# Patient Record
Sex: MALE | Race: WHITE | Employment: OTHER | URBAN - NONMETROPOLITAN AREA
[De-identification: names, ages, dates, MRNs, and addresses within clinical notes are randomized per-mention and may not be internally consistent; named-entity substitution may affect disease eponyms.]

---

## 2017-01-10 ENCOUNTER — TELEPHONE (OUTPATIENT)
Dept: FAMILY MEDICINE CLINIC | Age: 72
End: 2017-01-10

## 2017-01-24 ENCOUNTER — OFFICE VISIT (OUTPATIENT)
Dept: FAMILY MEDICINE CLINIC | Age: 72
End: 2017-01-24
Payer: MEDICARE

## 2017-01-24 VITALS
WEIGHT: 255.1 LBS | SYSTOLIC BLOOD PRESSURE: 122 MMHG | OXYGEN SATURATION: 98 % | RESPIRATION RATE: 18 BRPM | HEIGHT: 72 IN | DIASTOLIC BLOOD PRESSURE: 80 MMHG | HEART RATE: 80 BPM | BODY MASS INDEX: 34.55 KG/M2

## 2017-01-24 DIAGNOSIS — R06.02 SOB (SHORTNESS OF BREATH) ON EXERTION: ICD-10-CM

## 2017-01-24 DIAGNOSIS — R94.31 ABNORMAL ELECTROCARDIOGRAM (ECG) (EKG): ICD-10-CM

## 2017-01-24 DIAGNOSIS — R00.2 HEART PALPITATIONS: Primary | ICD-10-CM

## 2017-01-24 PROCEDURE — G8484 FLU IMMUNIZE NO ADMIN: HCPCS | Performed by: NURSE PRACTITIONER

## 2017-01-24 PROCEDURE — G8419 CALC BMI OUT NRM PARAM NOF/U: HCPCS | Performed by: NURSE PRACTITIONER

## 2017-01-24 PROCEDURE — 3017F COLORECTAL CA SCREEN DOC REV: CPT | Performed by: NURSE PRACTITIONER

## 2017-01-24 PROCEDURE — 4040F PNEUMOC VAC/ADMIN/RCVD: CPT | Performed by: NURSE PRACTITIONER

## 2017-01-24 PROCEDURE — 99213 OFFICE O/P EST LOW 20 MIN: CPT | Performed by: NURSE PRACTITIONER

## 2017-01-24 PROCEDURE — 1123F ACP DISCUSS/DSCN MKR DOCD: CPT | Performed by: NURSE PRACTITIONER

## 2017-01-24 PROCEDURE — G8427 DOCREV CUR MEDS BY ELIG CLIN: HCPCS | Performed by: NURSE PRACTITIONER

## 2017-01-24 PROCEDURE — 1036F TOBACCO NON-USER: CPT | Performed by: NURSE PRACTITIONER

## 2017-01-24 RX ORDER — ASPIRIN 81 MG/1
81 TABLET ORAL DAILY
Qty: 30 TABLET | Refills: 3 | Status: SHIPPED | OUTPATIENT
Start: 2017-01-24 | End: 2017-06-01 | Stop reason: SDUPTHER

## 2017-01-24 ASSESSMENT — ENCOUNTER SYMPTOMS
EYE ITCHING: 0
TROUBLE SWALLOWING: 0
COUGH: 0
EYE PAIN: 0
SHORTNESS OF BREATH: 1
WHEEZING: 0
SINUS PRESSURE: 0
BLOOD IN STOOL: 0
EYE DISCHARGE: 0
ABDOMINAL PAIN: 0
RHINORRHEA: 0
SORE THROAT: 0
CHEST TIGHTNESS: 0
EYE REDNESS: 0
COLOR CHANGE: 0
NAUSEA: 0
BACK PAIN: 0
CONSTIPATION: 0
DIARRHEA: 0

## 2017-02-07 DIAGNOSIS — R00.2 HEART PALPITATIONS: ICD-10-CM

## 2017-02-09 ENCOUNTER — TELEPHONE (OUTPATIENT)
Dept: FAMILY MEDICINE CLINIC | Age: 72
End: 2017-02-09

## 2017-04-04 ENCOUNTER — OFFICE VISIT (OUTPATIENT)
Dept: FAMILY MEDICINE CLINIC | Age: 72
End: 2017-04-04
Payer: MEDICARE

## 2017-04-04 VITALS
OXYGEN SATURATION: 98 % | WEIGHT: 257 LBS | SYSTOLIC BLOOD PRESSURE: 114 MMHG | RESPIRATION RATE: 18 BRPM | BODY MASS INDEX: 34.81 KG/M2 | HEART RATE: 84 BPM | HEIGHT: 72 IN | DIASTOLIC BLOOD PRESSURE: 72 MMHG

## 2017-04-04 DIAGNOSIS — N15.9 KIDNEY INFECTION: Primary | ICD-10-CM

## 2017-04-04 DIAGNOSIS — K59.00 CONSTIPATION, UNSPECIFIED CONSTIPATION TYPE: ICD-10-CM

## 2017-04-04 DIAGNOSIS — K64.9 HEMORRHOIDS, UNSPECIFIED HEMORRHOID TYPE: ICD-10-CM

## 2017-04-04 DIAGNOSIS — K92.1 BLOODY STOOL: ICD-10-CM

## 2017-04-04 DIAGNOSIS — F41.9 ANXIETY: ICD-10-CM

## 2017-04-04 DIAGNOSIS — R10.31 RIGHT LOWER QUADRANT ABDOMINAL PAIN: ICD-10-CM

## 2017-04-04 DIAGNOSIS — I10 ESSENTIAL HYPERTENSION: ICD-10-CM

## 2017-04-04 PROCEDURE — G8427 DOCREV CUR MEDS BY ELIG CLIN: HCPCS | Performed by: NURSE PRACTITIONER

## 2017-04-04 PROCEDURE — 4040F PNEUMOC VAC/ADMIN/RCVD: CPT | Performed by: NURSE PRACTITIONER

## 2017-04-04 PROCEDURE — 1036F TOBACCO NON-USER: CPT | Performed by: NURSE PRACTITIONER

## 2017-04-04 PROCEDURE — 81002 URINALYSIS NONAUTO W/O SCOPE: CPT | Performed by: NURSE PRACTITIONER

## 2017-04-04 PROCEDURE — 3017F COLORECTAL CA SCREEN DOC REV: CPT | Performed by: NURSE PRACTITIONER

## 2017-04-04 PROCEDURE — 99214 OFFICE O/P EST MOD 30 MIN: CPT | Performed by: NURSE PRACTITIONER

## 2017-04-04 PROCEDURE — G8417 CALC BMI ABV UP PARAM F/U: HCPCS | Performed by: NURSE PRACTITIONER

## 2017-04-04 PROCEDURE — 1123F ACP DISCUSS/DSCN MKR DOCD: CPT | Performed by: NURSE PRACTITIONER

## 2017-04-04 RX ORDER — BUSPIRONE HYDROCHLORIDE 7.5 MG/1
7.5 TABLET ORAL DAILY
COMMUNITY
End: 2017-07-12 | Stop reason: SDUPTHER

## 2017-04-04 RX ORDER — POLYETHYLENE GLYCOL 3350 17 G/17G
17 POWDER, FOR SOLUTION ORAL DAILY
Qty: 510 G | Refills: 5 | Status: SHIPPED | OUTPATIENT
Start: 2017-04-04 | End: 2017-08-01 | Stop reason: SDUPTHER

## 2017-04-04 RX ORDER — NAPROXEN 500 MG/1
1 TABLET ORAL DAILY
COMMUNITY
Start: 2017-03-03 | End: 2017-08-12

## 2017-04-21 ASSESSMENT — ENCOUNTER SYMPTOMS
BACK PAIN: 1
ABDOMINAL PAIN: 1
BLOOD IN STOOL: 1
CONSTIPATION: 1

## 2017-06-23 ENCOUNTER — TELEPHONE (OUTPATIENT)
Dept: PRIMARY CARE CLINIC | Age: 72
End: 2017-06-23

## 2017-07-10 RX ORDER — CHLORTHALIDONE 25 MG/1
TABLET ORAL
Qty: 30 TABLET | Refills: 4 | Status: SHIPPED | OUTPATIENT
Start: 2017-07-10

## 2017-07-12 DIAGNOSIS — F41.9 ANXIETY: ICD-10-CM

## 2017-07-12 RX ORDER — OMEPRAZOLE 20 MG/1
CAPSULE, DELAYED RELEASE ORAL
Qty: 30 CAPSULE | Refills: 0 | Status: SHIPPED | OUTPATIENT
Start: 2017-07-12 | End: 2017-08-01 | Stop reason: SDUPTHER

## 2017-07-12 RX ORDER — BUSPIRONE HYDROCHLORIDE 7.5 MG/1
7.5 TABLET ORAL DAILY
Qty: 30 TABLET | Refills: 0 | Status: SHIPPED | OUTPATIENT
Start: 2017-07-12 | End: 2017-08-01 | Stop reason: ALTCHOICE

## 2017-07-12 RX ORDER — METOPROLOL SUCCINATE 25 MG/1
25 TABLET, EXTENDED RELEASE ORAL DAILY
Qty: 30 TABLET | Refills: 0 | Status: SHIPPED | OUTPATIENT
Start: 2017-07-12 | End: 2017-08-01 | Stop reason: SDUPTHER

## 2017-07-12 RX ORDER — LORATADINE 10 MG/1
10 TABLET ORAL DAILY
Qty: 30 TABLET | Refills: 0 | Status: SHIPPED | OUTPATIENT
Start: 2017-07-12 | End: 2017-08-01 | Stop reason: SDUPTHER

## 2017-08-01 ENCOUNTER — OFFICE VISIT (OUTPATIENT)
Dept: FAMILY MEDICINE CLINIC | Age: 72
End: 2017-08-01
Payer: MEDICARE

## 2017-08-01 VITALS
SYSTOLIC BLOOD PRESSURE: 126 MMHG | HEIGHT: 72 IN | DIASTOLIC BLOOD PRESSURE: 82 MMHG | RESPIRATION RATE: 18 BRPM | WEIGHT: 258 LBS | OXYGEN SATURATION: 97 % | HEART RATE: 69 BPM | BODY MASS INDEX: 34.95 KG/M2

## 2017-08-01 DIAGNOSIS — Z12.5 PROSTATE CANCER SCREENING: ICD-10-CM

## 2017-08-01 DIAGNOSIS — M25.561 CHRONIC PAIN OF RIGHT KNEE: ICD-10-CM

## 2017-08-01 DIAGNOSIS — K21.9 GASTROESOPHAGEAL REFLUX DISEASE WITHOUT ESOPHAGITIS: ICD-10-CM

## 2017-08-01 DIAGNOSIS — R21 SKIN RASH: ICD-10-CM

## 2017-08-01 DIAGNOSIS — J30.89 ENVIRONMENTAL AND SEASONAL ALLERGIES: ICD-10-CM

## 2017-08-01 DIAGNOSIS — I10 ESSENTIAL HYPERTENSION: Primary | ICD-10-CM

## 2017-08-01 DIAGNOSIS — G89.29 CHRONIC PAIN OF LEFT KNEE: ICD-10-CM

## 2017-08-01 DIAGNOSIS — K59.00 CONSTIPATION, UNSPECIFIED CONSTIPATION TYPE: ICD-10-CM

## 2017-08-01 DIAGNOSIS — G89.29 CHRONIC PAIN OF RIGHT KNEE: ICD-10-CM

## 2017-08-01 DIAGNOSIS — M25.562 CHRONIC PAIN OF LEFT KNEE: ICD-10-CM

## 2017-08-01 DIAGNOSIS — E78.1 HYPERTRIGLYCERIDEMIA: ICD-10-CM

## 2017-08-01 PROCEDURE — G8427 DOCREV CUR MEDS BY ELIG CLIN: HCPCS | Performed by: NURSE PRACTITIONER

## 2017-08-01 PROCEDURE — 99214 OFFICE O/P EST MOD 30 MIN: CPT | Performed by: NURSE PRACTITIONER

## 2017-08-01 PROCEDURE — 3017F COLORECTAL CA SCREEN DOC REV: CPT | Performed by: NURSE PRACTITIONER

## 2017-08-01 PROCEDURE — G8417 CALC BMI ABV UP PARAM F/U: HCPCS | Performed by: NURSE PRACTITIONER

## 2017-08-01 PROCEDURE — 4040F PNEUMOC VAC/ADMIN/RCVD: CPT | Performed by: NURSE PRACTITIONER

## 2017-08-01 PROCEDURE — 1123F ACP DISCUSS/DSCN MKR DOCD: CPT | Performed by: NURSE PRACTITIONER

## 2017-08-01 PROCEDURE — 1036F TOBACCO NON-USER: CPT | Performed by: NURSE PRACTITIONER

## 2017-08-01 RX ORDER — LORATADINE 10 MG/1
10 TABLET ORAL DAILY
Qty: 30 TABLET | Refills: 5 | Status: SHIPPED | OUTPATIENT
Start: 2017-08-01 | End: 2018-07-16 | Stop reason: SDUPTHER

## 2017-08-01 RX ORDER — METOPROLOL SUCCINATE 25 MG/1
25 TABLET, EXTENDED RELEASE ORAL DAILY
Qty: 30 TABLET | Refills: 5 | Status: SHIPPED | OUTPATIENT
Start: 2017-08-01

## 2017-08-01 RX ORDER — POLYETHYLENE GLYCOL 3350 17 G/17G
17 POWDER, FOR SOLUTION ORAL DAILY
Qty: 510 G | Refills: 5 | Status: SHIPPED | OUTPATIENT
Start: 2017-08-01 | End: 2017-08-31

## 2017-08-01 RX ORDER — OMEPRAZOLE 20 MG/1
CAPSULE, DELAYED RELEASE ORAL
Qty: 30 CAPSULE | Refills: 5 | Status: SHIPPED | OUTPATIENT
Start: 2017-08-01

## 2017-08-01 ASSESSMENT — ENCOUNTER SYMPTOMS
BACK PAIN: 1
ABDOMINAL PAIN: 1
BLOOD IN STOOL: 1
CONSTIPATION: 1

## 2017-08-12 RX ORDER — NAPROXEN 500 MG/1
TABLET ORAL
Qty: 30 TABLET | Refills: 4 | Status: SHIPPED | OUTPATIENT
Start: 2017-08-12

## 2017-08-26 PROBLEM — J30.89 ENVIRONMENTAL AND SEASONAL ALLERGIES: Status: ACTIVE | Noted: 2017-08-26

## 2018-07-16 RX ORDER — LORATADINE 10 MG/1
TABLET ORAL
Qty: 30 TABLET | Refills: 2 | Status: SHIPPED | OUTPATIENT
Start: 2018-07-16

## 2018-07-16 NOTE — TELEPHONE ENCOUNTER
Refill request received from pharmacy. Medication pending for approval.     Patient information below:      LDL Calculated (mg/dL)   Date Value   12/28/2016 see below     AST (U/L)   Date Value   12/28/2016 19     ALT (U/L)   Date Value   12/28/2016 23     BUN (mg/dL)   Date Value   12/28/2016 14      (goal A1C is < 7)   (goal LDL is <100) need 30-50% reduction from baseline     BP Readings from Last 3 Encounters:   08/01/17 126/82   04/04/17 114/72   01/24/17 122/80    (goal /80)      All Future Testing planned in [facility]:  Lab Frequency Next Occurrence   Psa screening Once 08/07/2017       Last Office Visit With PCP:  Visit date not found      Next Visit Date:  No future appointments.          Patient Active Problem List:     Essential hypertension     Gastroesophageal reflux disease without esophagitis     Chronic pain of left knee     Osteoarthritis of multiple joints     Hypertriglyceridemia, familial     Heart palpitations     SOB (shortness of breath) on exertion     Environmental and seasonal allergies

## 2021-03-22 ENCOUNTER — OFFICE VISIT (OUTPATIENT)
Dept: ORTHOPEDIC SURGERY | Age: 76
End: 2021-03-22
Payer: MEDICARE

## 2021-03-22 VITALS — HEIGHT: 72 IN | BODY MASS INDEX: 33.18 KG/M2 | TEMPERATURE: 97.3 F | WEIGHT: 245 LBS

## 2021-03-22 DIAGNOSIS — M25.562 LEFT KNEE PAIN, UNSPECIFIED CHRONICITY: Primary | ICD-10-CM

## 2021-03-22 PROCEDURE — 1123F ACP DISCUSS/DSCN MKR DOCD: CPT | Performed by: ORTHOPAEDIC SURGERY

## 2021-03-22 PROCEDURE — 1036F TOBACCO NON-USER: CPT | Performed by: ORTHOPAEDIC SURGERY

## 2021-03-22 PROCEDURE — 3017F COLORECTAL CA SCREEN DOC REV: CPT | Performed by: ORTHOPAEDIC SURGERY

## 2021-03-22 PROCEDURE — 4040F PNEUMOC VAC/ADMIN/RCVD: CPT | Performed by: ORTHOPAEDIC SURGERY

## 2021-03-22 PROCEDURE — 99204 OFFICE O/P NEW MOD 45 MIN: CPT | Performed by: ORTHOPAEDIC SURGERY

## 2021-03-22 PROCEDURE — G8484 FLU IMMUNIZE NO ADMIN: HCPCS | Performed by: ORTHOPAEDIC SURGERY

## 2021-03-22 PROCEDURE — G8427 DOCREV CUR MEDS BY ELIG CLIN: HCPCS | Performed by: ORTHOPAEDIC SURGERY

## 2021-03-22 PROCEDURE — G8417 CALC BMI ABV UP PARAM F/U: HCPCS | Performed by: ORTHOPAEDIC SURGERY

## 2021-03-22 NOTE — PROGRESS NOTES
Date:  3/22/2021    Name:  Odell Newman  Address:  [address] [place] [address]    :  1945      Age:   77 y.o.    SSN:  [id number]      Medical Record Number:  <[id number]>    Reason for Visit:    Chief Complaint    Knee Pain (new patient left knee. )      DOS:3/22/2021     HPI: Momo Osorio is a 77 y.o. male here today for evaluation of left knee pain that has been ongoing for several years with no associated injury. Patient has a remote history of left knee scope over 30 years ago. Patient has been previously treated with intra-articular cortisone injections and lubricant injections at the arthritis clinic with no improvement. Patient describes severe pain today that keeps him up at night and is present at rest.  Endorses some clicking and sensation of instability. Denies any locking or catching. Denies any numbness or tingling in his legs. Pain Assessment  Location of Pain: Knee  Location Modifiers: Left  Severity of Pain: 0  Quality of Pain: (n/a)  Duration of Pain: A few minutes  Frequency of Pain: Rarely  Aggravating Factors: Standing  Limiting Behavior: Yes  Relieving Factors: Rest  Result of Injury: No  Work-Related Injury: No  Are there other pain locations you wish to document?: No  ROS: Review of systems reviewed from Patient History Form completed today and available in the patient's chart under the Media tab. Past Medical History:   Diagnosis Date    Arthritis     Hypertension         History reviewed. No pertinent surgical history.     Family History   Problem Relation Age of Onset    Heart Disease Father     High Blood Pressure Father     Heart Disease Sister     Heart Disease Brother        Social History     Socioeconomic History    Marital status: Single     Spouse name: None    Number of children: None    Years of education: None    Highest education level: None   Occupational History    None   Social Needs    Financial resource strain: None    Knee Exam:        Gait/Alignment: Valgus alignment                            Patella tracking: Normal      Inspection/Skin: No rashes are identified.     Effusion: Small effusion     Palpation: Lateral joint line tenderness. Mild crepitus.     Range of Motion: Full extension and 120° of flexion     Strength: Mild quadriceps weakness.     Ligamentous Stability: Pseudo-laxity is present laterally. Anterior and posterior cruciate ligaments were intact. Lateral collateral ligament is intact. Correctable valgus      Neurologic and vascular: Skin is warm dry and well perfused. Sensation is intact to light touch over the knee.     Additional findings: Calf soft nontender. No patellar instability. Right knee exam    Gait: No use of assistive devices. No antalgic gait. Alignment: normal alignment. Inspection/skin: Skin is intact without erythema or ecchymosis. No gross deformity. Palpation: mild crepitus. no joint line tenderness present. Range of Motion: There is full range of motion. Strength: Normal quadriceps development. Effusion: No effusion or swelling present. Ligamentous stability: No cruciate or collateral ligament instability. Neurologic and vascular: Skin is warm and well-perfused. Sensation is intact to light-touch. Special tests: Negative Luh sign. Diagnostics:  Radiology:       Pertinent imaging was obtained, interpreted, and reviewed with the patient today, images only - no report available.     Radiographs were obtained and reviewed in the office; 4 views: bilateral PA, bilateral Poe, bilateral Merchants AND left lateral    Impression: left knee severe advanced tricompartmental knee osteoarthritis    Office Procedures:  Orders Placed This Encounter   Procedures    XR KNEE LEFT (MIN 4 VIEWS)     Order Specific Question:   Reason for exam:     Answer:   pain    XR KNEE RIGHT (3 VIEWS)     Order Specific Question:   Reason for exam:     Answer:   pain Assessment: 78 yo male with severe advanced left knee osteoarthritis    Plan: Pertinent imaging was reviewed. The etiology, natural history, and treatment options for the disorder were discussed. The roles of activity medication, antiinflammatories, injections, bracing, physical therapy, and surgical interventions were all described to the patient and questions were answered. Patient has bone-on-bone osteoarthritis which is limiting day-to-day activities and significantly impacting quality of life. Treatment has included exercises as well as anti-inflammatories medications and steroid injections without relief. Symptoms have been ongoing for over a year. At this point the patient is reasonable candidate for total knee arthroplasty. The procedure was discussed in detail as well as the potential complications of DVT, pulmonary embolism, loosening, persistent pain, infection, bleeding, neurologic injury and complications from anesthesia. The time required for rehabilitation was discussed. The patient feels that there is adequate support at home and that this would be a reasonable option after surgery. We are happy to move forward with this however patient lives an hour and a half away. We recommended seeking surgeon closer to home. Domo Matt is in agreement with this plan. All questions were answered to patient's satisfaction and was encouraged to call with any further questions. Total time spent for evaluation, education, and development of treatment plan: 45 minutes    Andres Ackerman, [address] [facility]  3/22/2021    During this exam, IAndres PA-C, functioned as a scribe for Dr. Janet Barksdale. The history taking and physical examination were performed by Dr. Janet Barksdale. All counseling during the appointment was performed between the patient and Dr. Janet Barksdale.  3/22/2021 3:54 PM    This dictation was performed with a verbal recognition program (DRAGON) and it was checked for errors. It is possible that there are still dictated errors within this office note. If so, please bring any areas to my attention for an addendum. All efforts were made to ensure that this office note is accurate. I attest that I met personally with the patient, performed the described exam, reviewed the radiographic studies and medical records associated with this patient and supervised the services that are described above.      Delfino Macdonald MD

## 2021-10-15 ENCOUNTER — CLINICAL DOCUMENTATION (OUTPATIENT)
Dept: OTHER | Age: 76
End: 2021-10-15

## 2023-10-24 ENCOUNTER — TRANSCRIBE ORDERS (OUTPATIENT)
Dept: HOME HEALTH SERVICES | Facility: HOME HEALTHCARE | Age: 78
End: 2023-10-24
Payer: MEDICARE

## 2023-10-24 ENCOUNTER — HOME HEALTH ADMISSION (OUTPATIENT)
Dept: HOME HEALTH SERVICES | Facility: HOME HEALTHCARE | Age: 78
End: 2023-10-24
Payer: MEDICARE

## 2023-10-24 DIAGNOSIS — Z47.1 AFTERCARE FOLLOWING LEFT KNEE JOINT REPLACEMENT SURGERY: Primary | ICD-10-CM

## 2023-10-24 DIAGNOSIS — Z96.652 AFTERCARE FOLLOWING LEFT KNEE JOINT REPLACEMENT SURGERY: Primary | ICD-10-CM

## 2023-11-22 ENCOUNTER — TRANSCRIBE ORDERS (OUTPATIENT)
Dept: HOME HEALTH SERVICES | Facility: HOME HEALTHCARE | Age: 78
End: 2023-11-22
Payer: MEDICARE

## 2023-11-22 ENCOUNTER — HOME HEALTH ADMISSION (OUTPATIENT)
Dept: HOME HEALTH SERVICES | Facility: HOME HEALTHCARE | Age: 78
End: 2023-11-22
Payer: MEDICARE

## 2023-11-22 DIAGNOSIS — Z47.1 AFTERCARE FOLLOWING LEFT KNEE JOINT REPLACEMENT SURGERY: Primary | ICD-10-CM

## 2023-11-22 DIAGNOSIS — Z96.652 AFTERCARE FOLLOWING LEFT KNEE JOINT REPLACEMENT SURGERY: Primary | ICD-10-CM

## 2023-11-24 ENCOUNTER — HOME CARE VISIT (OUTPATIENT)
Dept: HOME HEALTH SERVICES | Facility: HOME HEALTHCARE | Age: 78
End: 2023-11-24
Payer: MEDICARE

## 2023-11-24 VITALS
DIASTOLIC BLOOD PRESSURE: 73 MMHG | TEMPERATURE: 98 F | SYSTOLIC BLOOD PRESSURE: 117 MMHG | HEART RATE: 77 BPM | OXYGEN SATURATION: 99 %

## 2023-11-24 PROCEDURE — G0299 HHS/HOSPICE OF RN EA 15 MIN: HCPCS

## 2023-11-24 NOTE — Clinical Note
"SOC Note:    Home Health ordered for: disciplines SN/PT/OT    Reason for Hosp/Primary Dx/Co-morbidities: Left total knee replacment    Focus of Care: postop/incision site care/    Patient's goal(s): \"To get stronger and this knee healed up\"    Current Functional status/mobility/DME: walker/CMP machine    HB status/Living Arrangements: with spouse    Skin Integrity/wound status: left knee incison site not able to visualize at this time d/t surgical dressing in place    Code Status: CPR    Fall Risk/Safety concerns: weakness/d/t left knee replacement    Medication issues/Concerns:no    Additional Problems/Concerns: no    SDOH Barriers (i.e. caregiver concerns, social isolation, transportation, food insecurity, environment, income etc.)/Need for MSW: no    Plan for next visit: incision site care, medication education"

## 2023-11-24 NOTE — HOME HEALTH
"SOC Note:    Home Health ordered for: disciplines SN/PT/OT    Reason for Hosp/Primary Dx/Co-morbidities: Left total knee replacment    Focus of Care: postop/incision site care/    Patient's goal(s): \"To get stronger and this knee healed up\"    Current Functional status/mobility/DME: walker/CMP machine    HB status/Living Arrangements: with spouse    Skin Integrity/wound status: left knee incison site not able to visualize at this time d/t surgical dressing in place    Code Status: CPR    Fall Risk/Safety concerns: weakness/d/t left knee replacement    Medication issues/Concerns:no    Additional Problems/Concerns: no    SDOH Barriers (i.e. caregiver concerns, social isolation, transportation, food insecurity, environment, income etc.)/Need for MSW: n0    Plan for next visit: incision site care, medication education"

## 2023-11-25 ENCOUNTER — HOME CARE VISIT (OUTPATIENT)
Dept: HOME HEALTH SERVICES | Facility: HOME HEALTHCARE | Age: 78
End: 2023-11-25
Payer: MEDICARE

## 2023-11-25 PROCEDURE — G0151 HHCP-SERV OF PT,EA 15 MIN: HCPCS

## 2023-11-25 NOTE — Clinical Note
"Physical Therapy Initial Evaluation:    Patient Presentation and Current Problem(s): Mr. Prem William is a 79 y/o male evaluated by HH-PT on POD #3 following Left Total Knee Arthroplasty 11/22/23    Past Medical History: OA, HTN, GERD, MAGI, R elbow surgery    Current Level of Function: Requires SW and assistance for ambulation, assistance with transfers, pain limited standing and activity tolerance. Currently not driving. Left knee AROM ext -8* and flex 70*. Strength 3-/5 quads and hams    Prior Level of Function: Driving, I with ADLs and IADLs, amb at a community level without assistive device.    Pt/CG Goal(s): \"decrease pain, walk without walker or cane, return to driving and hobbies\"    Weight Bearing Status and Activity Precautions: WBAT L LE, fall precautions  Home environment/Caregiver Status: Lives with spouse in a 1 level home. 3-4 stairs to enter/exit home  DME: CPM, knee immobilizer, SCDs, cryocuff, SW, CPAP,   Code Status: CPR  Fall Risk: High fall risk    HH-PT Focus of Care: Aftercare Left total knee arthroplasty 11/22/23. PT will focus on Rehabilitation of pt from CLOF towards PLOF in keeping with pt's goals    Planned PT Interventions: therapeutic activities, gait training, therapeutic exercise, transfer training, neuromuscular re-education, manual therapy, modalities prn for pain, pt/CG education (HEP, symptom management, fall risk reduction), assist with DME as needed for safe mobility.    PT Frequency and Duration: 1w1, 2w3. Pt is agreeable with this plan of care. Pt will likely progress to outpatient PT in the next month    Plan of Care Reviewed with:  Care Team and Alexys Horner MD "

## 2023-11-26 VITALS
HEART RATE: 72 BPM | TEMPERATURE: 97.7 F | OXYGEN SATURATION: 95 % | RESPIRATION RATE: 16 BRPM | DIASTOLIC BLOOD PRESSURE: 78 MMHG | SYSTOLIC BLOOD PRESSURE: 126 MMHG

## 2023-11-26 NOTE — HOME HEALTH
"Physical Therapy Initial Evaluation:    Patient Presentation and Current Problem(s): Mr. Prem William is a 77 y/o male evaluated by HH-PT on POD #3 following Left Total Knee Arthroplasty 11/22/23    Past Medical History: OA, HTN, GERD, MAGI, R elbow surgery    Current Level of Function: Requires SW and assistance for ambulation, assistance with transfers, pain limited standing and activity tolerance. Currently not driving. Left knee AROM ext -8* and flex 70*. Strength 3-/5 quads and hams    Prior Level of Function: Driving, I with ADLs and IADLs, amb at a community level without assistive device.    Pt/CG Goal(s): \"decrease pain, walk without walker or cane, return to driving and hobbies\"    Weight Bearing Status and Activity Precautions: WBAT L LE, fall precautions  Home environment/Caregiver Status: Lives with spouse in a 1 level home. 3-4 stairs to enter/exit home  DME: CPM, knee immobilizer, SCDs, cryocuff, SW, CPAP,   Code Status: CPR  Fall Risk: High fall risk    HH-PT Focus of Care: Aftercare Left total knee arthroplasty 11/22/23. PT will focus on Rehabilitation of pt from CLOF towards PLOF in keeping with pt's goals    Planned PT Interventions: therapeutic activities, gait training, therapeutic exercise, transfer training, neuromuscular re-education, manual therapy, modalities prn for pain, pt/CG education (HEP, symptom management, fall risk reduction), assist with DME as needed for safe mobility.    PT Frequency and Duration: 1w1, 2w3. Pt is agreeable with this plan of care. Pt will likely progress to outpatient PT in the next month    Plan of Care Reviewed with:  Care Team and Alexys Horner MD"

## 2023-11-27 ENCOUNTER — HOME CARE VISIT (OUTPATIENT)
Dept: HOME HEALTH SERVICES | Facility: HOME HEALTHCARE | Age: 78
End: 2023-11-27
Payer: MEDICARE

## 2023-11-27 PROCEDURE — G0151 HHCP-SERV OF PT,EA 15 MIN: HCPCS

## 2023-11-28 ENCOUNTER — HOME CARE VISIT (OUTPATIENT)
Dept: HOME HEALTH SERVICES | Facility: HOME HEALTHCARE | Age: 78
End: 2023-11-28
Payer: MEDICARE

## 2023-11-28 PROCEDURE — G0300 HHS/HOSPICE OF LPN EA 15 MIN: HCPCS

## 2023-11-29 VITALS
OXYGEN SATURATION: 97 % | DIASTOLIC BLOOD PRESSURE: 78 MMHG | SYSTOLIC BLOOD PRESSURE: 134 MMHG | HEART RATE: 84 BPM | RESPIRATION RATE: 16 BRPM | TEMPERATURE: 97.5 F

## 2023-11-30 ENCOUNTER — HOME CARE VISIT (OUTPATIENT)
Dept: HOME HEALTH SERVICES | Facility: HOME HEALTHCARE | Age: 78
End: 2023-11-30
Payer: MEDICARE

## 2023-11-30 PROCEDURE — G0151 HHCP-SERV OF PT,EA 15 MIN: HCPCS

## 2023-11-30 NOTE — HOME HEALTH
Routine Visit Note: PT treatment    Skill/education provided: Ther ex instruction with HEP progression, gait and transfer training, use of cryotherapy and CPM, manual therapy to improve L knee ROM    Patient/caregiver response: Pt reported increased L knee pain with end-range flexion but returned to baseline following treatment.    Plan for next visit: Progress and modify therapy interventions to help pt reach his functional goals.    Other pertinent info: PT educated pt to avoid over-doing-it with any activity, elevate L LE when resting.

## 2023-12-01 ENCOUNTER — HOME CARE VISIT (OUTPATIENT)
Dept: HOME HEALTH SERVICES | Facility: HOME HEALTHCARE | Age: 78
End: 2023-12-01
Payer: MEDICARE

## 2023-12-01 VITALS
OXYGEN SATURATION: 99 % | HEART RATE: 76 BPM | DIASTOLIC BLOOD PRESSURE: 69 MMHG | TEMPERATURE: 98.2 F | SYSTOLIC BLOOD PRESSURE: 117 MMHG

## 2023-12-01 PROCEDURE — G0299 HHS/HOSPICE OF RN EA 15 MIN: HCPCS

## 2023-12-02 VITALS
TEMPERATURE: 96.4 F | SYSTOLIC BLOOD PRESSURE: 124 MMHG | DIASTOLIC BLOOD PRESSURE: 78 MMHG | OXYGEN SATURATION: 93 % | RESPIRATION RATE: 16 BRPM | HEART RATE: 75 BPM

## 2023-12-02 NOTE — HOME HEALTH
Routine Visit Note: LPN    Skill/education provided: Performed labs for INR. Patient's INR was 1.0. I called the doctors office and reported the result. Assessment of wound/dressing site. Instructed patient on s/s of poor circulation. Instructed on s/s of infection and prevention. I left my phone number and the patient's phone number for return instructions.    Patient/caregiver response: Patient tolerated well. Patient verbalized understanding of instuctions    Plan for next visit: Lab for INR    Other pertinent info: N/A

## 2023-12-03 VITALS
HEART RATE: 68 BPM | TEMPERATURE: 97.7 F | OXYGEN SATURATION: 98 % | DIASTOLIC BLOOD PRESSURE: 76 MMHG | RESPIRATION RATE: 16 BRPM | SYSTOLIC BLOOD PRESSURE: 120 MMHG

## 2023-12-03 NOTE — HOME HEALTH
Routine Visit Note: PT treatment    Skill/education provided: Ther ex instruction, manual therapy and stretiching, progression of ther ex to prone hang and knee flexion. Gait training on stairs.    Patient/caregiver response: Pt demonstrated increased ambulation distance. Knee flexion to 88* and ext to 0* on PROM.     Plan for next visit: Progress to standing ther ex and continue treatment to facilitate pt in reaching his functional goals    Other pertinent info: Mild increase in L knee edema noted today. Negative Katja's sign

## 2023-12-04 ENCOUNTER — HOME CARE VISIT (OUTPATIENT)
Dept: HOME HEALTH SERVICES | Facility: HOME HEALTHCARE | Age: 78
End: 2023-12-04
Payer: MEDICARE

## 2023-12-04 PROCEDURE — G0300 HHS/HOSPICE OF LPN EA 15 MIN: HCPCS

## 2023-12-05 ENCOUNTER — HOME CARE VISIT (OUTPATIENT)
Dept: HOME HEALTH SERVICES | Facility: HOME HEALTHCARE | Age: 78
End: 2023-12-05
Payer: MEDICARE

## 2023-12-05 VITALS — RESPIRATION RATE: 16 BRPM | TEMPERATURE: 97.8 F

## 2023-12-05 PROCEDURE — G0157 HHC PT ASSISTANT EA 15: HCPCS

## 2023-12-05 NOTE — HOME HEALTH
Routine Visit Note:Greeted by patient at door with no signs of distress. Patient reports regular compliance with his HEP multiple times daily and mentions that his ROM has been improving significantly since returning home.    Skill/education provided: Instructed gait training, ROM exercise, and balance exercise. Educated patient on HEP and pain management.    Patient/caregiver response: Patient tolerated all treatment well today and exhibits improvement overall today / verbalizes understanding of all provided education.     Plan for next visit: Progress with gait training, therapeutic exercise, and balance training. Review HEP and progress appropriately.

## 2023-12-06 VITALS
SYSTOLIC BLOOD PRESSURE: 134 MMHG | DIASTOLIC BLOOD PRESSURE: 81 MMHG | RESPIRATION RATE: 16 BRPM | HEART RATE: 67 BPM | OXYGEN SATURATION: 96 % | TEMPERATURE: 96.9 F

## 2023-12-06 NOTE — HOME HEALTH
Routine Visit Note: LPN    Skill/education provided: Performed labs for INR. Patient's INR was 1.1. I called the doctors office and reported the result to Charissa. She instructed patient to continue taking 3mg of coumadin daily and for the nurse to repeat INR test on Monday,12/11/23. Patient has an appointment with the doctor on the same day. Charissa and the patient agreed for comes in on Monday, and the INR will be done at the office lab downstairs. Wound/dressing site on left knee was assessed by this nurse.. Instructed patient on s/s of poor circulation, s/s of infection and prevention.     Patient/caregiver response: Patient tolerated well. Patient verbalized understanding of instuctions    Plan for next visit: Surgical site assessment and wound care to left knee. Lab for INR    Other pertinent info: N/A

## 2023-12-07 ENCOUNTER — HOME CARE VISIT (OUTPATIENT)
Dept: HOME HEALTH SERVICES | Facility: HOME HEALTHCARE | Age: 78
End: 2023-12-07
Payer: MEDICARE

## 2023-12-07 VITALS
SYSTOLIC BLOOD PRESSURE: 138 MMHG | HEART RATE: 76 BPM | RESPIRATION RATE: 16 BRPM | OXYGEN SATURATION: 96 % | DIASTOLIC BLOOD PRESSURE: 84 MMHG | TEMPERATURE: 97.1 F

## 2023-12-07 PROCEDURE — G0157 HHC PT ASSISTANT EA 15: HCPCS

## 2023-12-08 NOTE — HOME HEALTH
"Routine Visit Note: Greeted at door by patient, who was first seen ambulating with rolling walker, showing no apparent signs of distress. Patient states that he is feeling \"pretty good\" and mentions that he is walking more and feeling \"much better\"  reports regular compliance with his HEP as prescribed.     Skill/education provided: Instructed gait training, therapeutic exercise/ROM exercise, and balance training today. Educated patient on HEP, fall prevention strategies, and pain managment.    Patient/caregiver response: Patient tolerated all treatment well today, with no signs of distress, excessive fatigue, or pain. Patient verbalizes understanding of all provided education today and appears on track to meet his goals / exhibits improvement overall today. Active knee flexion 115 dgrees, knee extension - full TKE    Plan for next visit: Patient would benefit from continued skilled PT to address deficits in BLE strength/ROM, balance, endurance, gait, and to improve overall functional mobility."

## 2023-12-12 ENCOUNTER — HOME CARE VISIT (OUTPATIENT)
Dept: HOME HEALTH SERVICES | Facility: HOME HEALTHCARE | Age: 78
End: 2023-12-12
Payer: MEDICARE

## 2023-12-12 PROCEDURE — G0151 HHCP-SERV OF PT,EA 15 MIN: HCPCS

## 2023-12-12 NOTE — Clinical Note
Physical Therapy 30 day Reassessment:    Summary of Care: Mr. Prem William and his CG have participated in 6 HH-PT encounters since our initial evaluation on 11/25/23. Our focus of care has been Aftercare Left total knee arthroplasty (11/22/23). PT Interventions have included: therapeutic activities, gait training, therapeutic exercise, transfer training, neuromuscular re-education, manual therapy, modalities prn for pain, pt/CG education (HEP, symptom management, fall risk reduction), assist with CPM and DME as needed for safe mobility.     Progress thus far: Pt is has learned and is compliant with HEP. Pain is reduced. His strength and ROM are improved in L knee. He is transferring and ambulating with less assistance. L knee AAROM 0* ext, 95* flexion. Strength quad 3+/5, ham 4-/5    Patient/CG Participation: Excellent    Remaining Problems: L knee pain, moderate fall risk, L quad/ham weakness, difficulty walking, increased time and effort for transfers and ADLs. Pt not driving. Pt reporting bowel regulation problems with episodes of both constipation and diarrhea over the past 1-2 weeks.    Plan of Care Changes: Based on pt participation, motivation, progress and rehab potential, continued HH-PT is indicated. He is still homebound and [place] area. Continue HH-PT 1w5 beginning wk of 12/18/23. Pt/CG are agreeable with this plan of care.    Plan of Care Reviewed with: HH Care Team and Alexys Horner MD

## 2023-12-13 VITALS
OXYGEN SATURATION: 97 % | RESPIRATION RATE: 16 BRPM | TEMPERATURE: 97.7 F | SYSTOLIC BLOOD PRESSURE: 150 MMHG | DIASTOLIC BLOOD PRESSURE: 89 MMHG | HEART RATE: 80 BPM

## 2023-12-14 ENCOUNTER — HOME CARE VISIT (OUTPATIENT)
Dept: HOME HEALTH SERVICES | Facility: HOME HEALTHCARE | Age: 78
End: 2023-12-14
Payer: MEDICARE

## 2023-12-15 ENCOUNTER — HOME CARE VISIT (OUTPATIENT)
Dept: HOME HEALTH SERVICES | Facility: HOME HEALTHCARE | Age: 78
End: 2023-12-15
Payer: MEDICARE

## 2023-12-15 PROCEDURE — G0299 HHS/HOSPICE OF RN EA 15 MIN: HCPCS

## 2023-12-16 VITALS
OXYGEN SATURATION: 97 % | RESPIRATION RATE: 16 BRPM | DIASTOLIC BLOOD PRESSURE: 81 MMHG | SYSTOLIC BLOOD PRESSURE: 139 MMHG | TEMPERATURE: 96.8 F | HEART RATE: 74 BPM

## 2023-12-16 NOTE — HOME HEALTH
Routine Visit Note: LPN    Skill/education provided: Performed labs for INR. Patient's INR was 1.1. I called the doctors office and reported the result. I left my phone number and the patient's phone number for return instructions.  Instructed patient medcation management to include purpose, dose, frequency, side effects and  food/drug interactions. Assessment of wound. Instructed patient on s/s of infection and prevention. I left my phone number and the patient's phone number for return instructions.    Patient/caregiver response: Patient verbalized understanding of instuctions    Plan for next visit:  Lab draw for INR. Medcation management to include purpose, dose, frequency, side effects  food/drug interactions.     Other pertinent info: N/A

## 2023-12-18 ENCOUNTER — HOME CARE VISIT (OUTPATIENT)
Dept: HOME HEALTH SERVICES | Facility: HOME HEALTHCARE | Age: 78
End: 2023-12-18
Payer: MEDICARE

## 2023-12-18 PROCEDURE — G0299 HHS/HOSPICE OF RN EA 15 MIN: HCPCS

## 2023-12-18 NOTE — HOME HEALTH
Routine Visit Note: LPN    Skill/education provided: Performed labs for INR. Patient's INR was 1.3. I called the doctors office at [phone] and reported the result. I left my phone number and the patient's phone number for return instructions. Nurse Baron called back with instructions for the patient to take 6 mg of coumadin daily and for the INR test to be repeated on Friday 12/22/23. I relayed this infomation to the patient and closed the visit.     Instructed patient medcation management to include purpose, dose, frequency, side effects and  food/drug interactions.      Patient/caregiver response: Patient verbalized understanding of instuctions    Plan for next visit: Lab draw for INR. Medcation management to include purpose, dose, frequency, side effects  food/drug interactions.     Other pertinent info: N/A

## 2023-12-20 VITALS
DIASTOLIC BLOOD PRESSURE: 87 MMHG | HEART RATE: 90 BPM | SYSTOLIC BLOOD PRESSURE: 130 MMHG | TEMPERATURE: 96.7 F | OXYGEN SATURATION: 97 % | RESPIRATION RATE: 16 BRPM

## 2023-12-21 ENCOUNTER — HOME CARE VISIT (OUTPATIENT)
Dept: HOME HEALTH SERVICES | Facility: HOME HEALTHCARE | Age: 78
End: 2023-12-21
Payer: MEDICARE

## 2023-12-21 VITALS
OXYGEN SATURATION: 97 % | DIASTOLIC BLOOD PRESSURE: 83 MMHG | TEMPERATURE: 97.4 F | SYSTOLIC BLOOD PRESSURE: 137 MMHG | RESPIRATION RATE: 16 BRPM | HEART RATE: 77 BPM

## 2023-12-21 PROCEDURE — G0157 HHC PT ASSISTANT EA 15: HCPCS

## 2023-12-22 ENCOUNTER — HOME CARE VISIT (OUTPATIENT)
Dept: HOME HEALTH SERVICES | Facility: HOME HEALTHCARE | Age: 78
End: 2023-12-22
Payer: MEDICARE

## 2023-12-22 PROCEDURE — G0300 HHS/HOSPICE OF LPN EA 15 MIN: HCPCS

## 2023-12-22 NOTE — HOME HEALTH
"Routine Visit Note: Greeted at door by patient, who was first seen ambulating with no apparent signs of distress. Patient states that he is feeling \"good since I got over that allergic reaction\" / reports that he is having some light back/R hip pain. Patient reports regular compliance with his HEP as prescribed.     Skill/education provided: Instructed gait training, therapeutic exercise, and balance training today. Educated patient on HEP, pain managment strategies, and fall prevention.    Patient/caregiver response: Patient tolerated all treatment/progressions well today, with no signs of distress, excessive fatigue, or exacerbation of pain. Patient verbalizes understanding of all provided education today and appears on track to meet his goals / exhibits improvement overall today.     Plan for next visit: Patient would benefit from continued skilled PT to address deficits in BLE strength/ROM, balance, and overall functional mobility. Progress as tolerated next visit."

## 2023-12-27 ENCOUNTER — HOME CARE VISIT (OUTPATIENT)
Dept: HOME HEALTH SERVICES | Facility: HOME HEALTHCARE | Age: 78
End: 2023-12-27
Payer: MEDICARE

## 2023-12-27 PROCEDURE — G0299 HHS/HOSPICE OF RN EA 15 MIN: HCPCS

## 2023-12-29 VITALS
OXYGEN SATURATION: 97 % | DIASTOLIC BLOOD PRESSURE: 79 MMHG | RESPIRATION RATE: 16 BRPM | SYSTOLIC BLOOD PRESSURE: 125 MMHG | TEMPERATURE: 96.8 F | HEART RATE: 75 BPM

## 2023-12-29 NOTE — HOME HEALTH
Routine Visit Note: LPN    Skill/education provided: Performed labs for INR. Patient's INR was 1.7. I called the doctors office at [phone] and reported the result. I left my phone number and the patient's phone number for return instructions. Nurse Baron called back with instructions for the patient to continue taking the 6 mg of coumadin daily. The next instruction was for the INR test to be repeated on Teusday 12/27/23. She stated that she also notified the patient.      Instructed patient medcation management to include purpose, dose, frequency, side effects and  food/drug interactions.      Patient/caregiver response: Patient verbalized understanding of instuctions    Plan for next visit: Lab draw for INR. Medcation management to include purpose, dose, frequency, side effects  food/drug interactions.     Other pertinent info: N/A

## 2023-12-30 ENCOUNTER — HOME CARE VISIT (OUTPATIENT)
Dept: HOME HEALTH SERVICES | Facility: HOME HEALTHCARE | Age: 78
End: 2023-12-30
Payer: MEDICARE

## 2023-12-30 VITALS
OXYGEN SATURATION: 97 % | RESPIRATION RATE: 16 BRPM | SYSTOLIC BLOOD PRESSURE: 118 MMHG | DIASTOLIC BLOOD PRESSURE: 80 MMHG | TEMPERATURE: 97.7 F | HEART RATE: 62 BPM

## 2023-12-30 PROCEDURE — G0151 HHCP-SERV OF PT,EA 15 MIN: HCPCS

## 2023-12-30 PROCEDURE — G0300 HHS/HOSPICE OF LPN EA 15 MIN: HCPCS

## 2023-12-31 VITALS
HEART RATE: 92 BPM | RESPIRATION RATE: 16 BRPM | OXYGEN SATURATION: 96 % | SYSTOLIC BLOOD PRESSURE: 114 MMHG | TEMPERATURE: 97.9 F | DIASTOLIC BLOOD PRESSURE: 78 MMHG

## 2023-12-31 VITALS
RESPIRATION RATE: 16 BRPM | SYSTOLIC BLOOD PRESSURE: 118 MMHG | DIASTOLIC BLOOD PRESSURE: 83 MMHG | TEMPERATURE: 96.8 F | OXYGEN SATURATION: 97 % | HEART RATE: 92 BPM

## 2023-12-31 NOTE — HOME HEALTH
Routine Visit Note: LPN    Skill/education provided: Performed labs for INR. Patient's INR was 2.7. Patitent is taking coumadin daily, alternating  6mg  and 5mg.  I called the doctor on since this was the weekend and reported the result. I left my phone number. Dr. Keller called me back with instructions for the patient to continue taking  current coumadin dose as ordered. Patient notified.  Instructed patient medcation management to include purpose, dose, frequency, side effects and  food/drug interactions. Assessment of wound. Instructed patient on s/s of infection and prevention. I left my phone number and the patient's phone number for return instructions.    Patient/caregiver response: Patient verbalized understanding of instuctions    Plan for next visit:  Lab draw for INR. Medcation management to include purpose, dose, frequency, side effects  food/drug interactions.     Other pertinent info: N/A

## 2023-12-31 NOTE — HOME HEALTH
Routine Visit Note: LPN    Skill/education provided: Performed labs for INR. Patient's INR was 2.6. I called the doctors office and reported the result. I left my phone number and the patient's phone number for return instructions.  Instructed patient medcation management to include purpose, dose, frequency, side effects and  food/drug interactions. Assessment of wound. Instructed patient on s/s of infection and prevention. I left my phone number and the patient's phone number for return instructions.    Patient/caregiver response: Patient verbalized understanding of instuctions    Plan for next visit:  Lab draw for INR. Medcation management to include purpose, dose, frequency, side effects  food/drug interactions.     Other pertinent info: N/A

## 2024-01-01 ENCOUNTER — HOME CARE VISIT (OUTPATIENT)
Dept: HOME HEALTH SERVICES | Facility: HOME HEALTHCARE | Age: 79
End: 2024-01-01
Payer: MEDICARE

## 2024-01-04 ENCOUNTER — HOME CARE VISIT (OUTPATIENT)
Dept: HOME HEALTH SERVICES | Facility: HOME HEALTHCARE | Age: 79
End: 2024-01-04
Payer: MEDICARE

## 2024-01-04 VITALS — SYSTOLIC BLOOD PRESSURE: 140 MMHG | HEART RATE: 73 BPM | OXYGEN SATURATION: 99 % | DIASTOLIC BLOOD PRESSURE: 80 MMHG

## 2024-01-04 PROCEDURE — G0299 HHS/HOSPICE OF RN EA 15 MIN: HCPCS

## 2024-01-04 NOTE — HOME HEALTH
Discharge Summary/Summary of Care Provided:   Patient received home health for diagnosis: postop care to left knee and weekly PT/INR  Current level of functional ability: independent  Living arrangements: with spouse  Progress towards goals and/or Were all goals met? yes  If not all goals met, barriers that prevented patient from meeting goals: none  SDOH concerns (i.e. Caregiver availability, social isolation, environment, income, transportation access, food insecurity etc.)no  Follow-up appointment plans and community resources provided: important information: instructed to keep all follow up appointments

## 2024-01-04 NOTE — Clinical Note
Discharge Summary/Summary of Care Provided:   Patient received home health for diagnosis: postop care to left knee/and weekly PT/INR  Current level of functional ability: independent  Living arrangements: with spouse  Progress towards goals and/or Were all goals met? yes  If not all goals met, barriers that prevented patient from meeting goals: none  SDOH concerns (i.e. Caregiver availability, social isolation, environment, income, transportation access, food insecurity etc.)no  Follow-up appointment plans and community resources provided: important information: instructed to keep all follow up appointments

## 2024-01-08 ENCOUNTER — HOME CARE VISIT (OUTPATIENT)
Dept: HOME HEALTH SERVICES | Facility: HOME HEALTHCARE | Age: 79
End: 2024-01-08
Payer: MEDICARE